# Patient Record
Sex: MALE | Race: WHITE | URBAN - METROPOLITAN AREA
[De-identification: names, ages, dates, MRNs, and addresses within clinical notes are randomized per-mention and may not be internally consistent; named-entity substitution may affect disease eponyms.]

---

## 2018-06-30 ENCOUNTER — HOSPITAL ENCOUNTER (OUTPATIENT)
Dept: MEDSURG UNIT | Facility: HOSPITAL | Age: 28
End: 2018-07-02
Attending: INTERNAL MEDICINE | Admitting: INTERNAL MEDICINE

## 2022-04-30 NOTE — ED PROVIDER NOTES
Patient:   Paulie Cooper            MRN: [id number]            FIN: [id number]               Age:   28 years     Sex:  Male     :  1990   Associated Diagnoses:   N&V (nausea and vomiting); Dehydration; Acute renal failure   Author:   Laci CHAU, Baljinder Zhang      Basic Information   Time seen: Date & time 2018 08:24:00.   History source: Patient.   Arrival mode: Private vehicle.   History limitation: None.   Additional information: Chief Complaint from Nursing Triage Note : Chief Complaint   2018 8:14 CDT       Chief Complaint           Pt c/o N/V with abdominal cramping that started yesterday.  Pt states he has been outside working on project when it started.  .      History of Present Illness   The patient presents with Patient is a 19-year-old male presenting with complaint of nausea and vomiting since yesterday.  Patient denies any diarrhea.  No fever chills.  Patient states the nausea started yesterday morning while working outside.  Patient does complain of some dizziness when he stands.  Decreased by mouth intake secondary to nausea..  The course/duration of symptoms is: The character of symptoms is crampy.  The degree at onset was minimal.  The Location of pain at onset was diffuse.  The degree at present is minimal.  The Location of pain at present is diffuse.  The exacerbating factor is eating.  Therapy today: none.  Risk factors consist of none.  Associated symptoms: nausea, vomiting, denies chest pain, denies diarrhea, denies back pain, denies shortness of breath, denies fever, denies chills, denies headache and denies dizziness.        Review of Systems   Constitutional symptoms:  No fever, no chills, no sweats, no weakness, no fatigue, no decreased activity.    Skin symptoms:  No jaundice, no rash, no pruritus, no abrasions, no breakdown, no burns, no dryness, no petechiae, no lesion.    Eye symptoms:  Vision unchanged.   ENMT symptoms:  No ear pain, no sore throat, no  nasal congestion, no sinus pain.    Respiratory symptoms:  No shortness of breath, no orthopnea, no cough, no hemoptysis, no stridor, no wheezing.    Cardiovascular symptoms:  No chest pain, no palpitations, no tachycardia, no syncope, no diaphoresis, no peripheral edema.    Gastrointestinal symptoms:  Abdominal pain, nausea, vomiting, no diarrhea, no rectal bleeding, no rectal pain.    Genitourinary symptoms:  No dysuria, no hematuria.    Musculoskeletal symptoms:  No back pain, no Muscle pain, no Joint pain, no Claudication.    Neurologic symptoms:  Dizziness, no headache, no altered level of consciousness, no numbness, no tingling, no weakness.              Additional review of systems information: All systems reviewed as documented in chart.      Health Status   Allergies:    Allergies (1) Active Reaction  No Known Allergies None Documented  .      Past Medical/ Family/ Social History   Medical history:    No active or resolved past medical history items have been selected or recorded..   Surgical history:    No active procedure history items have been selected or recorded..   Family history:    No family history items have been selected or recorded..   Social history:    Social & Psychosocial Habits    No Data Available  , Alcohol use: Denies, Tobacco use: Regularly, Drug use: Denies.   Problem list:    No qualifying data available  .      Physical Examination               Vital Signs   Vital Signs   6/30/2018 8:14 CDT       Temperature Oral          36.6 DegC                             Temperature Oral (calculated)             97.88 DegF                             Peripheral Pulse Rate     93 bpm                             Respiratory Rate          18 br/min                             SpO2                      100 %                             Oxygen Therapy            Room air                             Systolic Blood Pressure   126 mmHg                             Diastolic Blood Pressure  91 mmHg  HI   .   Measurements   6/30/2018 8:14 CDT       Weight Dosing             66 kg                             Weight Measured and Calculated in Lbs     145.50 lb                             Weight Estimated          66 kg                             Height/Length Dosing      172 cm                             Height/Length Estimated   172 cm                             Body Mass Index Estimated 22.31 kg/m2  .   Oxygen saturation.   General:  Alert, no acute distress.    Skin:  Warm, dry, no rash.    Head:  Normocephalic, atraumatic.    Neck:  Supple, trachea midline, no tenderness.    Eye:  Extraocular movements are intact, vision unchanged.    Ears, nose, mouth and throat:  Mouth: Dry mucous membranes.   Respiratory:  Lungs are clear to auscultation, respirations are non-labored, breath sounds are equal, Symmetrical chest wall expansion.    Cardiovascular:  Regular rate and rhythm, No murmur, Normal peripheral perfusion.    Gastrointestinal:  Soft, Nontender, Non distended, Normal bowel sounds, No organomegaly, Guarding: Negative, Rebound: Negative.    Musculoskeletal:  Normal ROM, normal strength, no tenderness, no swelling, no deformity.    Neurological:  Alert and oriented to person, place, time, and situation, No focal neurological deficit observed, normal motor observed.    Psychiatric:  Cooperative, appropriate mood & affect.       Medical Decision Making   Orders  Launch Orders   Laboratory:  Lipase Level (Order): Stat collect, 6/30/2018 8:25 CDT, Blood, Lab Collect, 6/30/2018 8:25 CDT  CMP (Order): Stat collect, 6/30/2018 8:25 CDT, Blood, Lab Collect, 6/30/2018 8:25 CDT  CBC w/ Auto Diff (Order): Now collect, 6/30/2018 8:25 CDT, Blood, Lab Collect, 6/30/2018 8:25 CDT  Pharmacy:  NS (0.9% Sodium Chloride) Infusion 1000 mL (Order): 1,000 mL, 1,000 mL, IV, 1,000 mL/hr, start date 6/30/2018 8:25 CDT  Zofran 2 mg/mL injectable solution (Order): 8 mg, form: Injection, IV Push, Once, first dose 6/30/2018 8:25 CDT, stop  date 6/30/2018 8:25 CDT, STAT, Launch Orders   Admit/Transfer/Discharge:  Admit to Outpatient Observation (Order): 6/30/2018 11:18 CDT, [facility], No.       Impression and Plan   Diagnosis   N&V (nausea and vomiting) (STI98-VY R11.2)   Dehydration (RIV79-ES E86.0)   Acute renal failure (RIW35-KL N17.9)      Calls-Consults   -  6/30/2018 11:17:00 , Darya Rubi.    Plan   Condition: Stable.    Disposition: Admit time  6/30/2018 11:18:00, Place in Observation Unit.

## 2022-04-30 NOTE — H&P
Patient:   Paulie Cooper            MRN: [id number]            FIN: [id number]               Age:   28 years     Sex:  Male     :  1990   Associated Diagnoses:   None   Author:   Shanae Forbes MD      Basic Information   Source of history:  Self, Medical record.    Present at bedside:  Medical personnel.    Referral source:  Emergency department.    Provider information/ cc:    PCP: NONE  ADVANCED DIRECTIVES NONE  CODE STATUS: FULL.       Chief Complaint   2018 8:14 CDT       Pt c/o N/V with abdominal cramping that started yesterday.  Pt states he has been outside working on project when it started.      History of Present Illness   28-year-old  male presented to the ED with reports of N/V and associated abdominal pain/cramping. P t reports symptoms started yesterday and progressively got worse with dizzines. No reports of diarrhea, fever, chills or any sick contacts.  Patient states the nausea started yesterday morning while working outside.  He reports also decreased appetite over patst couple of days secondary to symptoms. Labs done in the ED revealed Sodium 127, chloride 82, glucose 129, BUN 63, creatinine 2.62; T bili 1.6, direct bili 0.20, indirect bili 1.40, AST 50, ALT 33, alkaline phosphatase 86, , WBCs 11.9, H&H 17.9/50.5, platelets 2.30.  Patient did receive IV hydration and pain medication in the ED which did provide some relief however still having nausea, weakness, and associated dizziness.  Patient is admitted to the hospitalist services for further management.  VS /96 pulse 65 respirations 18 temperature 36.6°C O2 saturation 98% on room air      Review of Systems   Except as document, all other systems reviewed and negative      Health Status   Allergies:    Allergic Reactions (Selected)  No Known Allergies   Current medications:  (Selected)   Inpatient Medications  Ordered  NS (0.9% Sodium Chloride) Infusion 1,000 mL: 1,000 mL, 1,000 mL, IV, 1,000  mL/hr, start date 06/30/18 8:25:00 CDT  Sodium Chloride 0.9% intravenous solution 1,000 mL: 1,000 mL, 1,000 mL, IV, 125 mL/hr, start date 06/30/18 12:19:00 CDT  Zofran: 4 mg, form: Injection, IV Push, q4hr PRN for nausea, first dose 06/30/18 12:19:00 CDT, STAT      Histories   Past Medical History:   Negative   Family History:   Negative to present medical condition   Procedure history:   Negative   Social History     Drinks alcohol socially  Denies any illicir drug use  Smokes 4 cigarettes daily  Lives with family.        Physical Examination      Vital Signs (last 24 hrs)_____  Last Charted___________  Temp Oral     36.6 DegC  (JUN 30 08:14)  Heart Rate Peripheral   70 bpm  (JUN 30 11:30)  Resp Rate         18 br/min  (JUN 30 11:30)  SBP      134 mmHg  (JUN 30 11:30)  DBP      90 mmHg  (JUN 30 11:30)  SpO2      100 %  (JUN 30 11:30)     General:  Alert and oriented, No acute distress, appears stated age, non-toxic.    Eye:  Extraocular movements are intact, Normal conjunctiva.    HENT:  Normocephalic, Oral mucosa is moist, No pharyngeal erythema.    Neck:  Supple, Non-tender.    Respiratory:  Lungs are clear to auscultation, Respirations are non-labored, Breath sounds are equal, Symmetrical chest wall expansion.    Cardiovascular:  Normal rate, Regular rhythm, No gallop.    Gastrointestinal:  Soft, Non-distended, Normal bowel sounds.         Abdomen: Tenderness, No rebound tenderness.    Genitourinary:  No costovertebral angle tenderness.    Musculoskeletal:  Moves upper and lower extremities freely and on command; generalized weakness.    Integumentary:  Warm, Dry, Pink.    Neurologic:  Alert, Oriented, Normal sensory, Normal motor function, No focal deficits.    Psychiatric:  Cooperative, Appropriate mood & affect.    Cognition and Speech:  Speech clear and coherent.       Review / Management   Laboratory Results   Today's Lab Results : PowerNote Discrete Results   6/30/2018 8:29 CDT       WBC                        11.9 x10(3)/mcL  HI                             RBC                       5.89 x10(6)/mcL                             Hgb                       17.9 gm/dL                             Hct                       50.5 %                             Platelet                  230 x10(3)/mcL                             MCV                       85.7 fL                             MCH                       30.4 pg                             MCHC                      35.4 gm/dL                             RDW                       11.9 %                             MPV                       10.9 fL                             Abs Neut                  10.18 x10(3)/mcL  HI                             Neutro Auto               85 %  NA                             Lymph Auto                5 %  NA                             Mono Auto                 9 %  NA                             Basophil Auto             0 %  NA                             Abs Neutro                10.18 x10(3)/mcL  HI                             Abs Lymph                 0.6 x10(3)/mcL                             Abs Mono                  1.1 x10(3)/mcL                             Abs Baso                  0.0 x10(3)/mcL                             Sodium Lvl                127 mmol/L  LOW                             Potassium Lvl             4.9 mmol/L                             Chloride                  82 mmol/L  LOW                             CO2                       20.0 mmol/L  LOW                             Calcium Lvl               9.9 mg/dL                             Glucose Lvl               129 mg/dL  HI                             BUN                       63.0 mg/dL  HI                             Creatinine                2.62 mg/dL  HI                             eGFR-AA                   37 mL/min/1.73 m2  NA                             eGFR-LEONA                  31 mL/min/1.73 m2  NA                             Bili Total                 1.6 mg/dL  HI                             Bili Direct               0.20 mg/dL                             Bili Indirect             1.40 mg/dL  HI                             AST                       50 unit/L  HI                             ALT                       33 unit/L                             Alk Phos                  86 unit/L                             Total Protein             9.3 gm/dL  HI                             Albumin Lvl               5.30 gm/dL  HI                             Globulin                  4.00 gm/dL  HI                             A/G Ratio                 1.3 ratio                             Lipase Lvl                199 unit/L                             Total CK                  565 unit/L  HI        Documentation reviewed:  Reviewed prior records, Reviewed home medications.    Condition:  Fair.       Impression and Plan   Diagnosis       Acute renal failure  -IV hydration  -labs in the am  -renal US    Rhabdomylosis  -IV hydration  -CPK in the am    Hyponatremia  -replete  -repeat labs in the am  -IV hydration    IVVD with dehydration  -IV hydration  -labs in the am    Elevated bili levels/ LFTs  -IV hydration  -labs in the am    Nausea with vomiting  -PRN anti-emetic therapy    Abdominal pain- resolving  -PRN pain management    Weakness  -fall precautions          FULL CODE STATUS  GI/DVT PROPHYLAXIS  PCP: NONE    I, Darya Izaguirre APRN, FNP, discussed the case with Dr. LORETTA Forbes. .     Orders     Orders   Laboratory:  CPK (Order): Routine collect, 7/1/2018 5:00 CDT, Blood, Lab Collect, 7/1/2018 5:00 CDT  CMP (Order): Routine collect, 7/1/2018 5:00 CDT, Blood, Lab Collect, 7/1/2018 5:00 CDT  CBC wo/Diff (Order): Routine collect, 7/1/2018 5:00 CDT, Blood, Lab Collect, 7/1/2018 5:00 CDT.     Education and Follow-up:       Counseled: Patient, Regarding diagnosis, Regarding treatment, Regarding medications.      I, Dr. Shanae Forbes, have examined and interviewed  the patient.  I have reviewed the documentation provided by the NP.  I agree with the medical decision making and treatment plan.  This is an otherwise healthy young male who has heat exhaustion and volume depletion.  Nausea and vomiting are decreased.  He just started a clear liquid diet and so far no vomiting.  His abdominal, leg, and arm cramps have resolved.  We will continue hydration with normal saline.  His rhabdomyolysis is mild.  We will recheck eletrolytes, creatitinine, and CPK in the morning.

## 2022-04-30 NOTE — DISCHARGE SUMMARY
Patient:   Paulie Cooper            MRN: [id number]            FIN: [id number]               Age:   28 years     Sex:  Male     :  1990   Associated Diagnoses:   None   Author:   Gigi Kilpatrick MD      Admit Date: 2018  Discharge Date: 2018    PHYSICIANS  Attending Physician - Gigi Kilpatrick MD  Attending Physician - Andrei CHAU, Shanae BRITO  Admitting Physician - Andrei CHAU, Shanae BRITO  Consulting Physician - Andrei CHAU, Shanae BRITO  Primary Care Physician - No PCP, No    DISCHARGE DIAGNOSIS  Acute kidney failure, unspecified (N17.9)   Dehydration (E86.0)   Nausea with vomiting, unspecified (R11.2)     PROCEDURES  No procedures recorded for this visit.    HOSPITAL COURSE  28-year-old  male presented to the ED with reports of N/V and associated abdominal pain/cramping. P t reports symptoms started yesterday and progressively got worse with dizzines. No reports of diarrhea, fever, chills or any sick contacts.  Patient states the nausea started yesterday morning while working outside.  He reports also decreased appetite over patst couple of days secondary to symptoms. Labs done in the ED revealed Sodium 127, chloride 82, glucose 129, BUN 63, creatinine 2.62; T bili 1.6, direct bili 0.20, indirect bili 1.40, AST 50, ALT 33, alkaline phosphatase 86, , WBCs 11.9, H&H 17.9/50.5, platelets 2.30.  Patient did receive IV hydration and pain medication in the ED which did provide some relief however still having nausea, weakness, and associated dizziness.  Patient was admitted to the hospitalist services for further management.  He was treated with aggressive IV hydraition and supportive care.  His renal function and LFTs improved with therapy, on discharge BUN/creatinine 35/0.95, AST/ALT 37/24.  He continued with some mild nausea without mother was tolerating soft diet well.  He was stable for discharge home, advised to continue adequate hydration and advance diet as  tolerated.    STATUS  Improved    DISPOSITION  Discharge to home    DIET  Full Liquids/Soft, advance as tolerated    ACTIVITY  As tolerated    IMMUNIZATIONS  No immunizations recorded for this visit.    FOLLOW-UP  Report to Emergency Department if symptoms return or worsen      DISCHARGE MEDICATION RECONCILIATION  Prescribed  ondansetron (Zofran 4 mg oral tablet) 4 mg, Oral, q8hr, PRN as needed for nausea/vomiting      PHYSICAL EXAM  Temperature  36.8  (09:45)  Systolic Blood Pressure 118  (09:45)  Diastolic Blood Pressure 79  (09:45)  Pulse   64  (09:45)  SpO2   100  (09:45)  Respiratory Rate  20  (09:45)    GENERAL: awake and in no acute distress  LUNGS: CTA B/L  CVS: RRR  ABD: Soft, non-tender, non-distended, bowel sounds present  NEURO: AAOx3  PSYCHIATRIC: Cooperative      Time spent on discharge 35 minutes